# Patient Record
Sex: MALE | Race: WHITE | NOT HISPANIC OR LATINO | URBAN - METROPOLITAN AREA
[De-identification: names, ages, dates, MRNs, and addresses within clinical notes are randomized per-mention and may not be internally consistent; named-entity substitution may affect disease eponyms.]

---

## 2023-07-10 PROBLEM — R74.8 ELEVATED LIVER ENZYMES: Status: ACTIVE | Noted: 2023-07-10

## 2023-07-10 PROBLEM — E66.9 OBESITY: Status: ACTIVE | Noted: 2023-07-10

## 2023-07-10 PROBLEM — E66.3 OVERWEIGHT: Status: ACTIVE | Noted: 2023-07-10

## 2023-07-18 ENCOUNTER — OFFICE VISIT (OUTPATIENT)
Dept: PRIMARY CARE | Facility: CLINIC | Age: 33
End: 2023-07-18
Payer: COMMERCIAL

## 2023-07-18 VITALS
OXYGEN SATURATION: 99 % | HEART RATE: 86 BPM | DIASTOLIC BLOOD PRESSURE: 82 MMHG | HEIGHT: 72 IN | RESPIRATION RATE: 12 BRPM | WEIGHT: 253 LBS | BODY MASS INDEX: 34.27 KG/M2 | SYSTOLIC BLOOD PRESSURE: 122 MMHG

## 2023-07-18 DIAGNOSIS — E78.2 MIXED DYSLIPIDEMIA: ICD-10-CM

## 2023-07-18 DIAGNOSIS — Z00.00 ROUTINE GENERAL MEDICAL EXAMINATION AT A HEALTH CARE FACILITY: Primary | ICD-10-CM

## 2023-07-18 PROCEDURE — 99395 PREV VISIT EST AGE 18-39: CPT | Performed by: FAMILY MEDICINE

## 2023-07-18 PROCEDURE — 1036F TOBACCO NON-USER: CPT | Performed by: FAMILY MEDICINE

## 2023-07-18 ASSESSMENT — ENCOUNTER SYMPTOMS
FEVER: 0
DIZZINESS: 0
CHILLS: 0
ABDOMINAL PAIN: 0
EYE PAIN: 0
COUGH: 0
DYSPHORIC MOOD: 0
BRUISES/BLEEDS EASILY: 0
SHORTNESS OF BREATH: 0
CONSTIPATION: 0
ABDOMINAL DISTENTION: 0
HEADACHES: 0
ARTHRALGIAS: 0
WEAKNESS: 0
DIFFICULTY URINATING: 0
BACK PAIN: 0
SORE THROAT: 0
ADENOPATHY: 0
NERVOUS/ANXIOUS: 0
FATIGUE: 0
DIARRHEA: 0
CHEST TIGHTNESS: 0
DYSURIA: 0
APPETITE CHANGE: 0
EYE REDNESS: 0
BLOOD IN STOOL: 0

## 2023-07-18 NOTE — PROGRESS NOTES
Subjective   Patient ID: Suhas Xie is a 32 y.o. male who presents for Annual Exam.  PMHX, PSHx, Fam hx, and Social hx reviewed.   New concerns none, labs showed elev TG/ VLDL   Vaccines current  Dentist seen at least yearly yes  Vision concerns none  Hearing concerns none  Diet is not overall healthy.   Smoker - no  Alcohol use - 0-2 drinks/week  Exercising 5 days per week.   Sexually active - yes, no issue  Colonoscopy NA           Review of Systems   Constitutional:  Negative for appetite change, chills, fatigue and fever.   HENT:  Negative for congestion, hearing loss and sore throat.    Eyes:  Negative for pain, redness and visual disturbance.   Respiratory:  Negative for cough, chest tightness and shortness of breath.    Cardiovascular:  Negative for chest pain and leg swelling.   Gastrointestinal:  Negative for abdominal distention, abdominal pain, blood in stool, constipation and diarrhea.   Genitourinary:  Negative for difficulty urinating and dysuria.   Musculoskeletal:  Negative for arthralgias and back pain.   Skin:  Negative for rash.   Neurological:  Negative for dizziness, weakness and headaches.   Hematological:  Negative for adenopathy. Does not bruise/bleed easily.   Psychiatric/Behavioral:  Negative for dysphoric mood. The patient is not nervous/anxious.        Objective   /82   Pulse 86   Resp 12   Ht 1.829 m (6')   Wt 115 kg (253 lb)   SpO2 99%   BMI 34.31 kg/m²    Physical Exam  Constitutional:       General: He is not in acute distress.     Appearance: Normal appearance. He is not ill-appearing.   HENT:      Head: Normocephalic and atraumatic.      Right Ear: Tympanic membrane, ear canal and external ear normal.      Left Ear: Tympanic membrane, ear canal and external ear normal.      Nose: Nose normal.      Mouth/Throat:      Mouth: Mucous membranes are moist.      Pharynx: No oropharyngeal exudate or posterior oropharyngeal erythema.   Eyes:      Extraocular Movements:  Extraocular movements intact.      Conjunctiva/sclera: Conjunctivae normal.      Pupils: Pupils are equal, round, and reactive to light.   Neck:      Vascular: No carotid bruit.   Cardiovascular:      Rate and Rhythm: Normal rate and regular rhythm.      Heart sounds: Normal heart sounds. No murmur heard.  Pulmonary:      Breath sounds: Normal breath sounds. No wheezing, rhonchi or rales.   Abdominal:      General: Bowel sounds are normal. There is no distension.      Palpations: Abdomen is soft. There is no mass.      Tenderness: There is no abdominal tenderness.   Musculoskeletal:         General: No swelling or deformity.      Cervical back: Neck supple. No tenderness.   Lymphadenopathy:      Cervical: No cervical adenopathy.   Skin:     General: Skin is warm and dry.      Findings: No lesion or rash.   Neurological:      Mental Status: He is alert and oriented to person, place, and time.      Sensory: No sensory deficit.      Motor: No weakness.      Coordination: Coordination normal.      Deep Tendon Reflexes: Reflexes normal.   Psychiatric:         Mood and Affect: Mood normal.         Behavior: Behavior normal.         Judgment: Judgment normal.           Assessment/Plan   Diagnoses and all orders for this visit:  Routine general medical examination - vaccines current. Labs reviewed and discussed. Discussed working on low carb/sugar diet and increasing cardiovascular exercise.  Mixed dyslipidemia - recheck fasting labs in 6 months.   Weight - recommend low carb diet, increasing water intake to at least 64oz/day, healthy snacking between meals, and regular cardiovascular exercise 150mins/week. Goal for weight loss is 1-2# per week.     Follow up in 1 year, 30min for physical

## 2023-07-18 NOTE — PROGRESS NOTES
Subjective   Patient ID: Suhas Xie is a 32 y.o. male who presents for Annual Exam.    HPI     Review of Systems    Objective   /82   Pulse 86   Resp 12   Ht 1.829 m (6')   Wt 115 kg (253 lb)   SpO2 99%   BMI 34.31 kg/m²     Physical Exam    Assessment/Plan

## 2024-06-29 ENCOUNTER — LAB (OUTPATIENT)
Dept: LAB | Facility: LAB | Age: 34
End: 2024-06-29
Payer: COMMERCIAL

## 2024-06-29 ENCOUNTER — OFFICE VISIT (OUTPATIENT)
Dept: PRIMARY CARE | Facility: CLINIC | Age: 34
End: 2024-06-29
Payer: COMMERCIAL

## 2024-06-29 VITALS
RESPIRATION RATE: 12 BRPM | WEIGHT: 248 LBS | SYSTOLIC BLOOD PRESSURE: 124 MMHG | OXYGEN SATURATION: 97 % | BODY MASS INDEX: 33.59 KG/M2 | DIASTOLIC BLOOD PRESSURE: 84 MMHG | HEART RATE: 82 BPM | HEIGHT: 72 IN

## 2024-06-29 DIAGNOSIS — R10.32 LLQ ABDOMINAL PAIN: Primary | ICD-10-CM

## 2024-06-29 DIAGNOSIS — R10.32 LLQ ABDOMINAL PAIN: ICD-10-CM

## 2024-06-29 DIAGNOSIS — Z00.00 HEALTHCARE MAINTENANCE: ICD-10-CM

## 2024-06-29 LAB — TSH SERPL-ACNC: 1.92 MIU/L (ref 0.44–3.98)

## 2024-06-29 PROCEDURE — 80053 COMPREHEN METABOLIC PANEL: CPT

## 2024-06-29 PROCEDURE — 84443 ASSAY THYROID STIM HORMONE: CPT

## 2024-06-29 PROCEDURE — 86140 C-REACTIVE PROTEIN: CPT

## 2024-06-29 PROCEDURE — 81003 URINALYSIS AUTO W/O SCOPE: CPT

## 2024-06-29 PROCEDURE — 85025 COMPLETE CBC W/AUTO DIFF WBC: CPT

## 2024-06-29 PROCEDURE — 86803 HEPATITIS C AB TEST: CPT

## 2024-06-29 PROCEDURE — 80061 LIPID PANEL: CPT

## 2024-06-29 PROCEDURE — 36415 COLL VENOUS BLD VENIPUNCTURE: CPT

## 2024-06-29 ASSESSMENT — ENCOUNTER SYMPTOMS
BRUISES/BLEEDS EASILY: 0
APPETITE CHANGE: 0
CONSTIPATION: 0
EYE PAIN: 0
BLOOD IN STOOL: 0
ABDOMINAL DISTENTION: 0
EYE REDNESS: 0
FEVER: 0
CHEST TIGHTNESS: 0
CHILLS: 0
DIARRHEA: 0
DIZZINESS: 0
COUGH: 0
DYSURIA: 0
ADENOPATHY: 0
HEADACHES: 0
DYSPHORIC MOOD: 0
ARTHRALGIAS: 0
FATIGUE: 0
BACK PAIN: 0
SHORTNESS OF BREATH: 0
ABDOMINAL PAIN: 1
DIFFICULTY URINATING: 0
SORE THROAT: 0
WEAKNESS: 0
NERVOUS/ANXIOUS: 0

## 2024-06-29 NOTE — PROGRESS NOTES
Subjective   Patient ID: Suhas Xie is a 33 y.o. male who presents for Abdominal Pain.    HPI     Review of Systems    Objective   /84   Pulse 82   Resp 12   Ht 1.829 m (6')   Wt 112 kg (248 lb)   SpO2 97%   BMI 33.63 kg/m²     Physical Exam    Assessment/Plan

## 2024-06-29 NOTE — PROGRESS NOTES
Subjective   Patient ID: Suhas Xie is a 33 y.o. male who presents for Abdominal Pain.  Pt has chronic anxiety and ?hx of IBS. When stressed he usually get frequent Bms and abdominal cramping . Onset was 2018 and at that time had CT negative for cause. Never had endoscopies. Now over the past 1 week he's had more severe symptoms with cramping up to 8/10 severity and BM slowing to 1x daily, usually 3-4x daily. Meals make worse and BM makes better. He has not had fever or chills. Stool has been more loose, nonbloody.       Abdominal Pain  Pertinent negatives include no arthralgias, constipation, diarrhea, dysuria, fever or headaches.       Review of Systems   Constitutional:  Negative for appetite change, chills, fatigue and fever.   HENT:  Negative for congestion, hearing loss and sore throat.    Eyes:  Negative for pain, redness and visual disturbance.   Respiratory:  Negative for cough, chest tightness and shortness of breath.    Cardiovascular:  Negative for chest pain and leg swelling.   Gastrointestinal:  Positive for abdominal pain. Negative for abdominal distention, blood in stool, constipation and diarrhea.   Genitourinary:  Negative for difficulty urinating and dysuria.   Musculoskeletal:  Negative for arthralgias and back pain.   Skin:  Negative for rash.   Neurological:  Negative for dizziness, weakness and headaches.   Hematological:  Negative for adenopathy. Does not bruise/bleed easily.   Psychiatric/Behavioral:  Negative for dysphoric mood. The patient is not nervous/anxious.        Objective   /84   Pulse 82   Resp 12   Ht 1.829 m (6')   Wt 112 kg (248 lb)   SpO2 97%   BMI 33.63 kg/m²    Physical Exam  Constitutional:       General: He is not in acute distress.     Appearance: Normal appearance.   Cardiovascular:      Rate and Rhythm: Normal rate and regular rhythm.      Heart sounds: Normal heart sounds. No murmur heard.  Pulmonary:      Effort: Pulmonary effort is normal.       Breath sounds: Normal breath sounds.   Abdominal:      Palpations: Abdomen is soft.      Tenderness: There is no abdominal tenderness. There is no guarding or rebound.   Neurological:      Mental Status: He is alert.   Psychiatric:         Mood and Affect: Mood normal.         Judgment: Judgment normal.           Assessment/Plan   Diagnoses and all orders for this visit:  LLQ abdominal pain - check labs and stat CT today given location of pain. If unreavealing would fit with IBS, would recommend Benefiber 1 round tsp daily with 20oz water. Also try daily Probiotic ( Cultuelle or Align).    Follow up in July as planned for physical

## 2024-06-30 DIAGNOSIS — R79.89 ABNORMAL CBC MEASUREMENT: Primary | ICD-10-CM

## 2024-06-30 LAB
ALBUMIN SERPL BCP-MCNC: 5 G/DL (ref 3.4–5)
ALP SERPL-CCNC: 63 U/L (ref 33–120)
ALT SERPL W P-5'-P-CCNC: 30 U/L (ref 10–52)
ANION GAP SERPL CALC-SCNC: 13 MMOL/L (ref 10–20)
APPEARANCE UR: CLEAR
AST SERPL W P-5'-P-CCNC: 21 U/L (ref 9–39)
BASOPHILS # BLD AUTO: 0.03 X10*3/UL (ref 0–0.1)
BASOPHILS NFR BLD AUTO: 0.4 %
BILIRUB SERPL-MCNC: 0.4 MG/DL (ref 0–1.2)
BILIRUB UR STRIP.AUTO-MCNC: NEGATIVE MG/DL
BUN SERPL-MCNC: 10 MG/DL (ref 6–23)
CALCIUM SERPL-MCNC: 9.9 MG/DL (ref 8.6–10.6)
CHLORIDE SERPL-SCNC: 103 MMOL/L (ref 98–107)
CHOLEST SERPL-MCNC: 234 MG/DL (ref 0–199)
CHOLESTEROL/HDL RATIO: 5.8
CO2 SERPL-SCNC: 28 MMOL/L (ref 21–32)
COLOR UR: COLORLESS
CREAT SERPL-MCNC: 1.08 MG/DL (ref 0.5–1.3)
CRP SERPL-MCNC: 0.21 MG/DL
EGFRCR SERPLBLD CKD-EPI 2021: >90 ML/MIN/1.73M*2
EOSINOPHIL # BLD AUTO: 0.34 X10*3/UL (ref 0–0.7)
EOSINOPHIL NFR BLD AUTO: 4.6 %
ERYTHROCYTE [DISTWIDTH] IN BLOOD BY AUTOMATED COUNT: 11.9 % (ref 11.5–14.5)
GLUCOSE SERPL-MCNC: 94 MG/DL (ref 74–99)
GLUCOSE UR STRIP.AUTO-MCNC: NORMAL MG/DL
HCT VFR BLD AUTO: 44.1 % (ref 41–52)
HCV AB SER QL: NONREACTIVE
HDLC SERPL-MCNC: 40.2 MG/DL
HGB BLD-MCNC: 15.1 G/DL (ref 13.5–17.5)
HOLD SPECIMEN: NORMAL
IMM GRANULOCYTES # BLD AUTO: 0.08 X10*3/UL (ref 0–0.7)
IMM GRANULOCYTES NFR BLD AUTO: 1.1 % (ref 0–0.9)
KETONES UR STRIP.AUTO-MCNC: NEGATIVE MG/DL
LDLC SERPL CALC-MCNC: 148 MG/DL
LEUKOCYTE ESTERASE UR QL STRIP.AUTO: NEGATIVE
LYMPHOCYTES # BLD AUTO: 3.05 X10*3/UL (ref 1.2–4.8)
LYMPHOCYTES NFR BLD AUTO: 41.7 %
MCH RBC QN AUTO: 30.4 PG (ref 26–34)
MCHC RBC AUTO-ENTMCNC: 34.2 G/DL (ref 32–36)
MCV RBC AUTO: 89 FL (ref 80–100)
MONOCYTES # BLD AUTO: 0.59 X10*3/UL (ref 0.1–1)
MONOCYTES NFR BLD AUTO: 8.1 %
NEUTROPHILS # BLD AUTO: 3.23 X10*3/UL (ref 1.2–7.7)
NEUTROPHILS NFR BLD AUTO: 44.1 %
NITRITE UR QL STRIP.AUTO: NEGATIVE
NON HDL CHOLESTEROL: 194 MG/DL (ref 0–149)
NRBC BLD-RTO: 0 /100 WBCS (ref 0–0)
PH UR STRIP.AUTO: 7 [PH]
PLATELET # BLD AUTO: 312 X10*3/UL (ref 150–450)
POTASSIUM SERPL-SCNC: 4.5 MMOL/L (ref 3.5–5.3)
PROT SERPL-MCNC: 7.6 G/DL (ref 6.4–8.2)
PROT UR STRIP.AUTO-MCNC: NEGATIVE MG/DL
RBC # BLD AUTO: 4.96 X10*6/UL (ref 4.5–5.9)
RBC # UR STRIP.AUTO: NEGATIVE /UL
SODIUM SERPL-SCNC: 139 MMOL/L (ref 136–145)
SP GR UR STRIP.AUTO: 1.01
TRIGL SERPL-MCNC: 229 MG/DL (ref 0–149)
UROBILINOGEN UR STRIP.AUTO-MCNC: NORMAL MG/DL
VLDL: 46 MG/DL (ref 0–40)
WBC # BLD AUTO: 7.3 X10*3/UL (ref 4.4–11.3)

## 2024-07-12 ENCOUNTER — HOSPITAL ENCOUNTER (OUTPATIENT)
Dept: RADIOLOGY | Facility: CLINIC | Age: 34
Discharge: HOME | End: 2024-07-12
Payer: COMMERCIAL

## 2024-07-12 DIAGNOSIS — R10.32 LLQ ABDOMINAL PAIN: ICD-10-CM

## 2024-07-12 PROCEDURE — A9698 NON-RAD CONTRAST MATERIALNOC: HCPCS | Performed by: FAMILY MEDICINE

## 2024-07-12 PROCEDURE — 2550000001 HC RX 255 CONTRASTS: Performed by: FAMILY MEDICINE

## 2024-07-12 PROCEDURE — 74176 CT ABD & PELVIS W/O CONTRAST: CPT

## 2024-07-13 ENCOUNTER — HOSPITAL ENCOUNTER (OUTPATIENT)
Dept: RADIOLOGY | Facility: CLINIC | Age: 34
End: 2024-07-13
Payer: COMMERCIAL

## 2024-07-25 ENCOUNTER — LAB (OUTPATIENT)
Dept: LAB | Facility: LAB | Age: 34
End: 2024-07-25
Payer: COMMERCIAL

## 2024-07-25 ENCOUNTER — APPOINTMENT (OUTPATIENT)
Dept: PRIMARY CARE | Facility: CLINIC | Age: 34
End: 2024-07-25
Payer: COMMERCIAL

## 2024-07-25 VITALS
RESPIRATION RATE: 12 BRPM | SYSTOLIC BLOOD PRESSURE: 124 MMHG | BODY MASS INDEX: 33.18 KG/M2 | DIASTOLIC BLOOD PRESSURE: 84 MMHG | OXYGEN SATURATION: 99 % | HEART RATE: 88 BPM | WEIGHT: 245 LBS | HEIGHT: 72 IN

## 2024-07-25 DIAGNOSIS — E66.09 CLASS 1 OBESITY DUE TO EXCESS CALORIES WITHOUT SERIOUS COMORBIDITY WITH BODY MASS INDEX (BMI) OF 33.0 TO 33.9 IN ADULT: ICD-10-CM

## 2024-07-25 DIAGNOSIS — D36.7 DERMOID CYST OF LEG, RIGHT: ICD-10-CM

## 2024-07-25 DIAGNOSIS — K58.9 IRRITABLE BOWEL SYNDROME, UNSPECIFIED TYPE: ICD-10-CM

## 2024-07-25 DIAGNOSIS — R79.89 ABNORMAL CBC MEASUREMENT: ICD-10-CM

## 2024-07-25 DIAGNOSIS — E78.2 MIXED DYSLIPIDEMIA: ICD-10-CM

## 2024-07-25 DIAGNOSIS — Z00.00 HEALTHCARE MAINTENANCE: Primary | ICD-10-CM

## 2024-07-25 LAB
BASOPHILS # BLD AUTO: 0.05 X10*3/UL (ref 0–0.1)
BASOPHILS NFR BLD AUTO: 0.8 %
EOSINOPHIL # BLD AUTO: 0.37 X10*3/UL (ref 0–0.7)
EOSINOPHIL NFR BLD AUTO: 5.7 %
ERYTHROCYTE [DISTWIDTH] IN BLOOD BY AUTOMATED COUNT: 11.8 % (ref 11.5–14.5)
HCT VFR BLD AUTO: 41.5 % (ref 41–52)
HGB BLD-MCNC: 14.3 G/DL (ref 13.5–17.5)
IMM GRANULOCYTES # BLD AUTO: 0.05 X10*3/UL (ref 0–0.7)
IMM GRANULOCYTES NFR BLD AUTO: 0.8 % (ref 0–0.9)
LYMPHOCYTES # BLD AUTO: 2.8 X10*3/UL (ref 1.2–4.8)
LYMPHOCYTES NFR BLD AUTO: 43.4 %
MCH RBC QN AUTO: 29.5 PG (ref 26–34)
MCHC RBC AUTO-ENTMCNC: 34.5 G/DL (ref 32–36)
MCV RBC AUTO: 86 FL (ref 80–100)
MONOCYTES # BLD AUTO: 0.49 X10*3/UL (ref 0.1–1)
MONOCYTES NFR BLD AUTO: 7.6 %
NEUTROPHILS # BLD AUTO: 2.69 X10*3/UL (ref 1.2–7.7)
NEUTROPHILS NFR BLD AUTO: 41.7 %
NRBC BLD-RTO: 0 /100 WBCS (ref 0–0)
PLATELET # BLD AUTO: 333 X10*3/UL (ref 150–450)
RBC # BLD AUTO: 4.85 X10*6/UL (ref 4.5–5.9)
WBC # BLD AUTO: 6.5 X10*3/UL (ref 4.4–11.3)

## 2024-07-25 PROCEDURE — 3008F BODY MASS INDEX DOCD: CPT | Performed by: FAMILY MEDICINE

## 2024-07-25 PROCEDURE — 36415 COLL VENOUS BLD VENIPUNCTURE: CPT

## 2024-07-25 PROCEDURE — 90471 IMMUNIZATION ADMIN: CPT | Performed by: FAMILY MEDICINE

## 2024-07-25 PROCEDURE — 90707 MMR VACCINE SC: CPT | Performed by: FAMILY MEDICINE

## 2024-07-25 PROCEDURE — 99395 PREV VISIT EST AGE 18-39: CPT | Performed by: FAMILY MEDICINE

## 2024-07-25 PROCEDURE — 1036F TOBACCO NON-USER: CPT | Performed by: FAMILY MEDICINE

## 2024-07-25 PROCEDURE — 85025 COMPLETE CBC W/AUTO DIFF WBC: CPT

## 2024-07-25 RX ORDER — BISMUTH SUBSALICYLATE 262 MG
1 TABLET,CHEWABLE ORAL DAILY
COMMUNITY

## 2024-07-25 RX ORDER — BUTYROSPERMUM PARKII(SHEA BUTTER), SIMMONDSIA CHINENSIS (JOJOBA) SEED OIL, ALOE BARBADENSIS LEAF EXTRACT .01; 1; 3.5 G/100G; G/100G; G/100G
250 LIQUID TOPICAL 2 TIMES DAILY
COMMUNITY

## 2024-07-25 ASSESSMENT — ENCOUNTER SYMPTOMS
BLOOD IN STOOL: 0
BACK PAIN: 0
CHEST TIGHTNESS: 0
ARTHRALGIAS: 0
BRUISES/BLEEDS EASILY: 0
CHILLS: 0
COUGH: 0
FATIGUE: 0
DIFFICULTY URINATING: 0
HEADACHES: 0
EYE REDNESS: 0
DYSPHORIC MOOD: 0
ABDOMINAL PAIN: 0
ABDOMINAL DISTENTION: 0
EYE PAIN: 0
ADENOPATHY: 0
APPETITE CHANGE: 0
SHORTNESS OF BREATH: 0
DIZZINESS: 0
SORE THROAT: 0
FEVER: 0
WEAKNESS: 0
DYSURIA: 0
DIARRHEA: 0
NERVOUS/ANXIOUS: 0
CONSTIPATION: 0

## 2024-07-25 ASSESSMENT — PROMIS GLOBAL HEALTH SCALE
RATE_MENTAL_HEALTH: GOOD
RATE_PHYSICAL_HEALTH: FAIR
RATE_AVERAGE_FATIGUE: MILD
CARRYOUT_SOCIAL_ACTIVITIES: VERY GOOD
CARRYOUT_PHYSICAL_ACTIVITIES: COMPLETELY
RATE_QUALITY_OF_LIFE: VERY GOOD
RATE_AVERAGE_PAIN: 1
RATE_GENERAL_HEALTH: FAIR
EMOTIONAL_PROBLEMS: SOMETIMES
RATE_SOCIAL_SATISFACTION: VERY GOOD

## 2024-07-25 NOTE — PROGRESS NOTES
Subjective   Patient ID: Suhas Xie is a 33 y.o. male who presents for Annual Exam.  PMHX, PSHx, Fam hx, and Social hx reviewed.   New concerns Abd pain resolved from last month, CT negative. Doing better with Probiotic.  Vaccines MMR shot today  Dentist seen at least yearly yes  Vision concerns none  Hearing concerns none  Diet is usually overall healthy.   Smoker - no  Lung CT/screening - NA  AAA screening - NA  Alcohol use - 0-1 drink per month  Exercising 3 days per week.   Sexually active - yes, no issues  Colonoscopy NA                 Review of Systems   Constitutional:  Negative for appetite change, chills, fatigue and fever.   HENT:  Negative for congestion, hearing loss and sore throat.    Eyes:  Negative for pain, redness and visual disturbance.   Respiratory:  Negative for cough, chest tightness and shortness of breath.    Cardiovascular:  Negative for chest pain and leg swelling.   Gastrointestinal:  Negative for abdominal distention, abdominal pain, blood in stool, constipation and diarrhea.   Genitourinary:  Negative for difficulty urinating and dysuria.   Musculoskeletal:  Negative for arthralgias and back pain.   Skin:  Negative for rash.   Neurological:  Negative for dizziness, weakness and headaches.   Hematological:  Negative for adenopathy. Does not bruise/bleed easily.   Psychiatric/Behavioral:  Negative for dysphoric mood. The patient is not nervous/anxious.        Objective   /84   Pulse 88   Resp 12   Ht 1.829 m (6')   Wt 111 kg (245 lb)   SpO2 99%   BMI 33.23 kg/m²    Physical Exam  Constitutional:       General: He is not in acute distress.     Appearance: Normal appearance. He is not ill-appearing.   HENT:      Head: Normocephalic and atraumatic.      Right Ear: Tympanic membrane, ear canal and external ear normal.      Left Ear: Tympanic membrane, ear canal and external ear normal.      Nose: Nose normal.      Mouth/Throat:      Mouth: Mucous membranes are moist.       Pharynx: No oropharyngeal exudate or posterior oropharyngeal erythema.   Eyes:      Extraocular Movements: Extraocular movements intact.      Conjunctiva/sclera: Conjunctivae normal.      Pupils: Pupils are equal, round, and reactive to light.   Neck:      Thyroid: No thyroid mass or thyromegaly.      Vascular: No carotid bruit.   Cardiovascular:      Rate and Rhythm: Normal rate and regular rhythm.      Heart sounds: Normal heart sounds. No murmur heard.  Pulmonary:      Breath sounds: Normal breath sounds. No wheezing, rhonchi or rales.   Abdominal:      General: Bowel sounds are normal. There is no distension.      Palpations: Abdomen is soft. There is no mass.      Tenderness: There is no abdominal tenderness.   Musculoskeletal:         General: No swelling or deformity.      Cervical back: Neck supple. No tenderness.   Lymphadenopathy:      Cervical: No cervical adenopathy.   Skin:     General: Skin is warm and dry.      Findings: No lesion or rash.      Comments: R anterior upper thigh has subcentimeter well demarcated soft tissue consistent with cyst. He reports no change since noticed over 1yr ago.   Neurological:      Mental Status: He is alert and oriented to person, place, and time.      Sensory: No sensory deficit.      Motor: No weakness.      Coordination: Coordination normal.      Deep Tendon Reflexes: Reflexes normal.   Psychiatric:         Mood and Affect: Mood normal.         Behavior: Behavior normal.         Judgment: Judgment normal.           Assessment/Plan   Diagnoses and all orders for this visit:  Healthcare maintenance - MMR shot given today, other vaccines current. Rechecking CBC today.   Mixed dyslipidemia/Weight - recommend low carb diet, increasing water intake to at least 64oz/day, healthy snacking between meals, and regular cardiovascular exercise 150mins/week. Goal for weight loss is 1-2# per week.   Irritable bowel syndrome - improved, continue Probiotic  Cyst on R thigh - small,  asymptomatic and not changing. Monitor.    Follow up in 1 year, 30min for physical

## 2024-07-25 NOTE — PROGRESS NOTES
Subjective   Patient ID: Suhas Xie is a 33 y.o. male who presents for Annual Exam.    HPI     Review of Systems    Objective   /84   Pulse 88   Resp 12   Ht 1.829 m (6')   Wt 111 kg (245 lb)   SpO2 99%   BMI 33.23 kg/m²     Physical Exam    Assessment/Plan

## 2025-06-07 ASSESSMENT — ENCOUNTER SYMPTOMS
SORE THROAT: 1
STRIDOR: 0
SWOLLEN GLANDS: 1
TROUBLE SWALLOWING: 0
COUGH: 0
DIARRHEA: 0
NECK PAIN: 1
VOMITING: 0
HEADACHES: 0
ABDOMINAL PAIN: 0
HOARSE VOICE: 1
SHORTNESS OF BREATH: 0

## 2025-06-11 ENCOUNTER — APPOINTMENT (OUTPATIENT)
Dept: PRIMARY CARE | Facility: CLINIC | Age: 35
End: 2025-06-11
Payer: COMMERCIAL

## 2025-06-11 VITALS
OXYGEN SATURATION: 98 % | DIASTOLIC BLOOD PRESSURE: 66 MMHG | SYSTOLIC BLOOD PRESSURE: 107 MMHG | WEIGHT: 252 LBS | BODY MASS INDEX: 34.18 KG/M2 | HEART RATE: 80 BPM | RESPIRATION RATE: 16 BRPM

## 2025-06-11 DIAGNOSIS — R09.82 POST-NASAL DRAINAGE: ICD-10-CM

## 2025-06-11 DIAGNOSIS — R13.10 DYSPHAGIA, UNSPECIFIED TYPE: ICD-10-CM

## 2025-06-11 DIAGNOSIS — J02.9 SORE THROAT: Primary | ICD-10-CM

## 2025-06-11 LAB — POC RAPID STREP: NEGATIVE

## 2025-06-11 PROCEDURE — 1036F TOBACCO NON-USER: CPT | Performed by: FAMILY MEDICINE

## 2025-06-11 PROCEDURE — 99214 OFFICE O/P EST MOD 30 MIN: CPT | Performed by: FAMILY MEDICINE

## 2025-06-11 PROCEDURE — 87880 STREP A ASSAY W/OPTIC: CPT | Performed by: FAMILY MEDICINE

## 2025-06-11 RX ORDER — FLUTICASONE PROPIONATE 50 MCG
2 SPRAY, SUSPENSION (ML) NASAL DAILY
Qty: 16 G | Refills: 1 | Status: SHIPPED | OUTPATIENT
Start: 2025-06-11

## 2025-06-11 ASSESSMENT — ENCOUNTER SYMPTOMS
DIARRHEA: 0
ABDOMINAL PAIN: 0
HEADACHES: 0
COUGH: 0
SHORTNESS OF BREATH: 0
VOMITING: 0
TROUBLE SWALLOWING: 0
STRIDOR: 0

## 2025-06-11 NOTE — PROGRESS NOTES
Subjective   Patient ID: Suhas Xie is a 34 y.o. male who presents for Sore Throat.    HPI   PCP Dr. Marc    Sore throat x 2 months.  Had left upper tooth pulled 8 yrs ago.  States he had a complication that caused a hole from his gums into his sinuses (he can put water in his mouth and squirt it out of his nose).  Was told that he will likely need a bone graft to correct this.  Area occ gets inflamed and infected, dentist prescribed Augmentin 1.5 months ago for infxn (facial swelling and lymph node swelling resolved w/Abx).  Sore throat improved from 5-6/10 to 2/10.  Also has post nasal gtt.  No rhinorrhea, sinus pressure, cough, SOB, wheeze, fever, chills, loss of smell or taste, nausea, vomiting, diarrhea, headaches, body aches.    Dysphagia x 2 yrs.  First meal of the day, occ feels like food gets stuck in the throat (occurs maybe once/month).  Does not occur w/liquids.  No prior eval.    Review of Systems   HENT:  Negative for congestion, drooling, ear discharge, ear pain and trouble swallowing.    Respiratory:  Negative for cough, shortness of breath and stridor.    Gastrointestinal:  Negative for abdominal pain, diarrhea and vomiting.   Neurological:  Negative for headaches.     Objective   /66   Pulse 80   Resp 16   Wt 114 kg (252 lb)   SpO2 98%   BMI 34.18 kg/m²     Physical Exam  Constitutional:       General: He is not in acute distress.     Appearance: He is obese.   HENT:      Right Ear: Tympanic membrane normal.      Left Ear: Tympanic membrane normal.      Nose:      Right Sinus: No maxillary sinus tenderness or frontal sinus tenderness.      Left Sinus: No maxillary sinus tenderness or frontal sinus tenderness.      Mouth/Throat:      Pharynx: Oropharynx is clear. No oropharyngeal exudate or posterior oropharyngeal erythema.      Comments: Missing left upper tooth x1  Eyes:      Conjunctiva/sclera: Conjunctivae normal.   Cardiovascular:      Rate and Rhythm: Normal rate and  regular rhythm.      Heart sounds: Normal heart sounds. No murmur heard.     No friction rub. No gallop.   Pulmonary:      Effort: Pulmonary effort is normal.      Breath sounds: Normal breath sounds. No wheezing, rhonchi or rales.   Lymphadenopathy:      Cervical: No cervical adenopathy.   Neurological:      Mental Status: He is oriented to person, place, and time.   Psychiatric:         Mood and Affect: Mood normal.         Behavior: Behavior normal.           Component  Ref Range & Units 13:25   POC Rapid Strep  Negative Negative        Assessment/Plan   Diagnoses and all orders for this visit:  Sore throat  -     POCT Rapid Strep A manually resulted  Post-nasal drainage  -     fluticasone (Flonase) 50 mcg/actuation nasal spray; Administer 2 sprays into each nostril once daily. Shake gently. Before first use, prime pump. After use, clean tip and replace cap.  Dysphagia, unspecified type  -     Referral to Gastroenterology; Future    Rapid strep negative.  Flonase provided for post nasal drainage.  Referred to gastroenterology for dysphagia.    F/U w/PCP.

## 2025-06-11 NOTE — PATIENT INSTRUCTIONS
Rapid strep negative.  Flonase provided for post nasal drainage.  Referred to gastroenterology for dysphagia.  Follow up with PCP if these symptoms worsen or fail to improve as anticipated.     F/U w/PCP.

## 2025-07-31 ENCOUNTER — APPOINTMENT (OUTPATIENT)
Dept: PRIMARY CARE | Facility: CLINIC | Age: 35
End: 2025-07-31
Payer: COMMERCIAL

## 2025-08-05 ENCOUNTER — APPOINTMENT (OUTPATIENT)
Dept: PRIMARY CARE | Facility: CLINIC | Age: 35
End: 2025-08-05
Payer: COMMERCIAL

## 2025-08-05 VITALS
SYSTOLIC BLOOD PRESSURE: 128 MMHG | HEART RATE: 85 BPM | OXYGEN SATURATION: 97 % | BODY MASS INDEX: 34.43 KG/M2 | WEIGHT: 254.2 LBS | DIASTOLIC BLOOD PRESSURE: 82 MMHG | HEIGHT: 72 IN

## 2025-08-05 DIAGNOSIS — Z00.00 HEALTHCARE MAINTENANCE: Primary | ICD-10-CM

## 2025-08-05 DIAGNOSIS — K58.0 IRRITABLE BOWEL SYNDROME WITH DIARRHEA: ICD-10-CM

## 2025-08-05 DIAGNOSIS — R13.10 DYSPHAGIA, UNSPECIFIED TYPE: ICD-10-CM

## 2025-08-05 DIAGNOSIS — E66.09 CLASS 1 OBESITY DUE TO EXCESS CALORIES WITHOUT SERIOUS COMORBIDITY WITH BODY MASS INDEX (BMI) OF 34.0 TO 34.9 IN ADULT: ICD-10-CM

## 2025-08-05 DIAGNOSIS — E78.2 MIXED DYSLIPIDEMIA: ICD-10-CM

## 2025-08-05 DIAGNOSIS — E66.811 CLASS 1 OBESITY DUE TO EXCESS CALORIES WITHOUT SERIOUS COMORBIDITY WITH BODY MASS INDEX (BMI) OF 34.0 TO 34.9 IN ADULT: ICD-10-CM

## 2025-08-05 DIAGNOSIS — K04.7 DENTAL ABSCESS: ICD-10-CM

## 2025-08-05 PROCEDURE — 99395 PREV VISIT EST AGE 18-39: CPT | Performed by: FAMILY MEDICINE

## 2025-08-05 PROCEDURE — 1036F TOBACCO NON-USER: CPT | Performed by: FAMILY MEDICINE

## 2025-08-05 PROCEDURE — 3008F BODY MASS INDEX DOCD: CPT | Performed by: FAMILY MEDICINE

## 2025-08-05 ASSESSMENT — PROMIS GLOBAL HEALTH SCALE
CARRYOUT_SOCIAL_ACTIVITIES: EXCELLENT
RATE_MENTAL_HEALTH: GOOD
EMOTIONAL_PROBLEMS: SOMETIMES
CARRYOUT_PHYSICAL_ACTIVITIES: MOSTLY
RATE_AVERAGE_FATIGUE: MILD
RATE_SOCIAL_SATISFACTION: EXCELLENT
RATE_GENERAL_HEALTH: GOOD
RATE_AVERAGE_PAIN: 0
RATE_PHYSICAL_HEALTH: GOOD
RATE_QUALITY_OF_LIFE: VERY GOOD

## 2025-08-05 ASSESSMENT — ENCOUNTER SYMPTOMS
CHEST TIGHTNESS: 0
WEAKNESS: 0
ADENOPATHY: 0
APPETITE CHANGE: 0
SORE THROAT: 0
FATIGUE: 0
ARTHRALGIAS: 0
CONSTIPATION: 1
BACK PAIN: 0
DYSURIA: 0
BLOOD IN STOOL: 0
ABDOMINAL DISTENTION: 0
NERVOUS/ANXIOUS: 1
BRUISES/BLEEDS EASILY: 0
COUGH: 0
DIFFICULTY URINATING: 0
DIZZINESS: 0
EYE REDNESS: 0
ABDOMINAL PAIN: 0
HEADACHES: 0
FEVER: 0
CHILLS: 0
SHORTNESS OF BREATH: 0
DIARRHEA: 1
EYE PAIN: 0
DYSPHORIC MOOD: 0

## 2025-08-05 ASSESSMENT — PATIENT HEALTH QUESTIONNAIRE - PHQ9
2. FEELING DOWN, DEPRESSED OR HOPELESS: NOT AT ALL
SUM OF ALL RESPONSES TO PHQ9 QUESTIONS 1 AND 2: 0
1. LITTLE INTEREST OR PLEASURE IN DOING THINGS: NOT AT ALL

## 2025-08-05 NOTE — PROGRESS NOTES
Subjective   Patient ID: Suhas Xie is a 34 y.o. male who presents for Annual Exam.    HPI     Review of Systems    Objective   /82   Pulse 85   Wt 115 kg (254 lb 3.2 oz)   SpO2 97%   BMI 34.48 kg/m²     Physical Exam    Assessment/Plan

## 2025-08-05 NOTE — PROGRESS NOTES
Subjective   Patient ID: Suhas Xie is a 34 y.o. male who presents for Annual Exam.  PMHX, PSHx, Fam hx, and Social hx reviewed.   New concerns none  Vaccines - current  Dentist seen at least yearly yes  Vision concerns none  Hearing concerns none  Diet is usually overall healthy.   Smoker - no  Lung CT/screening - NA  AAA screening - NA  Alcohol use - averages 0-3 drinks per week  Exercising 3-4 days per week.   Sexually active - yes, no issues  Colonoscopy NA    He is having intermittent trouble swallowing solids. To see GI. His IBS-D is stable. Taking Probiotics and fiber help as needed.             Review of Systems   Constitutional:  Negative for appetite change, chills, fatigue and fever.   HENT:  Positive for dental problem (seeing dentist for oral abscess). Negative for congestion, hearing loss and sore throat.    Eyes:  Negative for pain, redness and visual disturbance.   Respiratory:  Negative for cough, chest tightness and shortness of breath.    Cardiovascular:  Negative for chest pain and leg swelling.   Gastrointestinal:  Positive for constipation and diarrhea. Negative for abdominal distention, abdominal pain and blood in stool.   Genitourinary:  Negative for difficulty urinating and dysuria.   Musculoskeletal:  Negative for arthralgias and back pain.   Skin:  Negative for rash.   Neurological:  Negative for dizziness, weakness and headaches.   Hematological:  Negative for adenopathy. Does not bruise/bleed easily.   Psychiatric/Behavioral:  Negative for dysphoric mood. The patient is nervous/anxious (doing well since doing counseling earlier this year.).        Objective   /82   Pulse 85   Wt 115 kg (254 lb 3.2 oz)   SpO2 97%   BMI 34.48 kg/m²    Physical Exam  Constitutional:       General: He is not in acute distress.     Appearance: Normal appearance. He is not ill-appearing.   HENT:      Head: Normocephalic and atraumatic.      Right Ear: Tympanic membrane, ear canal and external  ear normal. There is no impacted cerumen.      Left Ear: Tympanic membrane, ear canal and external ear normal. There is no impacted cerumen.      Nose: Nose normal. No congestion.      Mouth/Throat:      Mouth: Mucous membranes are moist.      Pharynx: No oropharyngeal exudate or posterior oropharyngeal erythema.     Eyes:      Conjunctiva/sclera: Conjunctivae normal.     Neck:      Thyroid: No thyroid mass or thyromegaly.      Vascular: No carotid bruit.     Cardiovascular:      Rate and Rhythm: Normal rate and regular rhythm.      Heart sounds: Normal heart sounds. No murmur heard.  Pulmonary:      Breath sounds: Normal breath sounds. No wheezing, rhonchi or rales.   Abdominal:      General: Bowel sounds are normal. There is no distension.      Palpations: Abdomen is soft. There is no mass.      Tenderness: There is no abdominal tenderness.     Musculoskeletal:         General: No swelling or deformity.      Cervical back: Neck supple. No tenderness.   Lymphadenopathy:      Cervical: No cervical adenopathy.     Skin:     General: Skin is warm and dry.      Findings: No lesion or rash.      Comments: subcutaneous lump R thigh is unchanged for ~10yrs     Neurological:      Mental Status: He is alert and oriented to person, place, and time.      Sensory: No sensory deficit.      Motor: No weakness.      Coordination: Coordination normal.      Deep Tendon Reflexes: Reflexes normal.     Psychiatric:         Mood and Affect: Mood normal.         Behavior: Behavior normal.         Judgment: Judgment normal.           Assessment/Plan   Diagnoses and all orders for this visit:  Healthcare maintenance  - vaccines current. Rechecking fasting labs.   Mixed dyslipidemia  - rechecking with fasting labs.   Irritable bowel syndrome with diarrhea - stable, continue fiber/probiotics as needed  Dysphagia - keep plan to see GI.  Weight - recommend low carb diet, increasing water intake to at least 64oz/day, healthy snacking between  meals, and regular cardiovascular exercise 150mins/week. Goal for weight loss is 1-2# per week.     Follow up in 1 year for physical

## 2025-08-11 ENCOUNTER — APPOINTMENT (OUTPATIENT)
Dept: GASTROENTEROLOGY | Facility: CLINIC | Age: 35
End: 2025-08-11
Payer: COMMERCIAL

## 2025-08-11 DIAGNOSIS — R13.19 ESOPHAGEAL DYSPHAGIA: Primary | ICD-10-CM

## 2025-08-11 DIAGNOSIS — R10.12 LUQ PAIN: ICD-10-CM

## 2025-08-11 PROCEDURE — 99204 OFFICE O/P NEW MOD 45 MIN: CPT

## 2025-08-11 ASSESSMENT — ENCOUNTER SYMPTOMS
CONSTIPATION: 0
CHILLS: 0
ABDOMINAL PAIN: 1
BLOOD IN STOOL: 0
TROUBLE SWALLOWING: 1
APPETITE CHANGE: 0
VOMITING: 0
ABDOMINAL DISTENTION: 0
ANAL BLEEDING: 0
DIARRHEA: 0
FATIGUE: 0
NAUSEA: 0
SHORTNESS OF BREATH: 0
RECTAL PAIN: 0
FEVER: 0
COUGH: 0

## 2025-08-16 LAB
ALBUMIN SERPL-MCNC: 5.1 G/DL (ref 3.6–5.1)
ALP SERPL-CCNC: 67 U/L (ref 36–130)
ALT SERPL-CCNC: 26 U/L (ref 9–46)
ANION GAP SERPL CALCULATED.4IONS-SCNC: 10 MMOL/L (CALC) (ref 7–17)
APPEARANCE UR: CLEAR
AST SERPL-CCNC: 20 U/L (ref 10–40)
BACTERIA #/AREA URNS HPF: NORMAL /HPF
BACTERIA UR CULT: NORMAL
BILIRUB SERPL-MCNC: 0.4 MG/DL (ref 0.2–1.2)
BILIRUB UR QL STRIP: NEGATIVE
BUN SERPL-MCNC: 15 MG/DL (ref 7–25)
CALCIUM SERPL-MCNC: 9.7 MG/DL (ref 8.6–10.3)
CHLORIDE SERPL-SCNC: 102 MMOL/L (ref 98–110)
CHOLEST SERPL-MCNC: 205 MG/DL
CHOLEST/HDLC SERPL: 5.1 (CALC)
CO2 SERPL-SCNC: 29 MMOL/L (ref 20–32)
COLOR UR: YELLOW
CREAT SERPL-MCNC: 1 MG/DL (ref 0.6–1.26)
EGFRCR SERPLBLD CKD-EPI 2021: 102 ML/MIN/1.73M2
ERYTHROCYTE [DISTWIDTH] IN BLOOD BY AUTOMATED COUNT: 12.6 % (ref 11–15)
GLUCOSE SERPL-MCNC: 96 MG/DL (ref 65–99)
GLUCOSE UR QL STRIP: NEGATIVE
HCT VFR BLD AUTO: 47.5 % (ref 38.5–50)
HDLC SERPL-MCNC: 40 MG/DL
HGB BLD-MCNC: 15.9 G/DL (ref 13.2–17.1)
HGB UR QL STRIP: NEGATIVE
HYALINE CASTS #/AREA URNS LPF: NORMAL /LPF
KETONES UR QL STRIP: NEGATIVE
LDLC SERPL CALC-MCNC: 125 MG/DL (CALC)
LEUKOCYTE ESTERASE UR QL STRIP: NEGATIVE
MCH RBC QN AUTO: 30.3 PG (ref 27–33)
MCHC RBC AUTO-ENTMCNC: 33.5 G/DL (ref 32–36)
MCV RBC AUTO: 90.5 FL (ref 80–100)
NITRITE UR QL STRIP: NEGATIVE
NONHDLC SERPL-MCNC: 165 MG/DL (CALC)
PH UR STRIP: 7 [PH] (ref 5–8)
PLATELET # BLD AUTO: 272 THOUSAND/UL (ref 140–400)
PMV BLD REES-ECKER: 9.2 FL (ref 7.5–12.5)
POTASSIUM SERPL-SCNC: 4.4 MMOL/L (ref 3.5–5.3)
PROT SERPL-MCNC: 7.8 G/DL (ref 6.1–8.1)
PROT UR QL STRIP: NEGATIVE
RBC # BLD AUTO: 5.25 MILLION/UL (ref 4.2–5.8)
RBC #/AREA URNS HPF: NORMAL /HPF
SERVICE CMNT-IMP: NORMAL
SODIUM SERPL-SCNC: 141 MMOL/L (ref 135–146)
SP GR UR STRIP: 1 (ref 1–1.03)
SQUAMOUS #/AREA URNS HPF: NORMAL /HPF
TRIGL SERPL-MCNC: 245 MG/DL
WBC # BLD AUTO: 5.8 THOUSAND/UL (ref 3.8–10.8)
WBC #/AREA URNS HPF: NORMAL /HPF

## 2025-09-05 ENCOUNTER — APPOINTMENT (OUTPATIENT)
Dept: GASTROENTEROLOGY | Facility: EXTERNAL LOCATION | Age: 35
End: 2025-09-05
Payer: COMMERCIAL

## 2025-09-09 ENCOUNTER — APPOINTMENT (OUTPATIENT)
Dept: GASTROENTEROLOGY | Facility: HOSPITAL | Age: 35
End: 2025-09-09
Payer: COMMERCIAL

## 2025-09-26 ENCOUNTER — APPOINTMENT (OUTPATIENT)
Dept: GASTROENTEROLOGY | Facility: CLINIC | Age: 35
End: 2025-09-26
Payer: COMMERCIAL

## 2026-08-06 ENCOUNTER — APPOINTMENT (OUTPATIENT)
Dept: PRIMARY CARE | Facility: CLINIC | Age: 36
End: 2026-08-06
Payer: COMMERCIAL